# Patient Record
Sex: FEMALE | Race: WHITE | Employment: FULL TIME | URBAN - METROPOLITAN AREA
[De-identification: names, ages, dates, MRNs, and addresses within clinical notes are randomized per-mention and may not be internally consistent; named-entity substitution may affect disease eponyms.]

---

## 2022-02-27 ENCOUNTER — APPOINTMENT (OUTPATIENT)
Dept: GENERAL RADIOLOGY | Age: 62
End: 2022-02-27
Payer: COMMERCIAL

## 2022-02-27 ENCOUNTER — HOSPITAL ENCOUNTER (EMERGENCY)
Age: 62
Discharge: HOME | End: 2022-02-27
Payer: COMMERCIAL

## 2022-02-27 VITALS
BODY MASS INDEX: 30.63 KG/M2 | RESPIRATION RATE: 20 BRPM | WEIGHT: 156 LBS | DIASTOLIC BLOOD PRESSURE: 78 MMHG | HEIGHT: 60 IN | HEART RATE: 54 BPM | TEMPERATURE: 98.1 F | OXYGEN SATURATION: 99 % | SYSTOLIC BLOOD PRESSURE: 136 MMHG

## 2022-02-27 DIAGNOSIS — S60.222A CONTUSION OF LEFT HAND, INITIAL ENCOUNTER: ICD-10-CM

## 2022-02-27 DIAGNOSIS — S00.31XA NASAL ABRASION, INITIAL ENCOUNTER: Primary | ICD-10-CM

## 2022-02-27 PROCEDURE — 99282 EMERGENCY DEPT VISIT SF MDM: CPT

## 2022-02-27 PROCEDURE — 73130 X-RAY EXAM OF HAND: CPT

## 2022-02-27 RX ORDER — BACITRACIN, NEOMYCIN, POLYMYXIN B 400; 3.5; 5 [USP'U]/G; MG/G; [USP'U]/G
OINTMENT TOPICAL
Status: DISCONTINUED
Start: 2022-02-27 | End: 2022-02-27 | Stop reason: HOSPADM

## 2022-02-27 ASSESSMENT — PAIN SCALES - GENERAL: PAINLEVEL_OUTOF10: 8

## 2022-02-27 ASSESSMENT — PAIN - FUNCTIONAL ASSESSMENT: PAIN_FUNCTIONAL_ASSESSMENT: 0-10

## 2022-02-27 NOTE — ED PROVIDER NOTES
Depression     Hypertension     MS (multiple sclerosis) ([facility] [address]          SURGICAL HISTORY     Past Surgical History:   Procedure Laterality Date    HIP SURGERY      bilateral hip replace    WISDOM TOOTH EXTRACTION           CURRENTMEDICATIONS       Discharge Medication List as of 2/27/2022  2:36 PM      CONTINUE these medications which have NOT CHANGED    Details   LISINOPRIL PO Take by mouth      NIFEdipine (PROCARDIA PO) Take by mouth      acetaminophen 650 MG TABS Take 650 mg by mouth every 4 hours as needed for Pain (For mild pain level 1-3 or for fever > 100.5). , Disp-120 tablet, R-0      omeprazole (PRILOSEC) 20 MG capsule Take 1 capsule by mouth 2 times daily. , Disp-30 capsule, R-3      diltiazem (DILACOR XR) 240 MG XR capsule Take 240 mg by mouth daily. diclofenac (VOLTAREN) 75 MG EC tablet Take 75 mg by mouth 2 times daily. DULoxetine (CYMBALTA) 30 MG capsule Take 30 mg by mouth daily. interferon beta-1a (AVONEX) 30 MCG/0.5ML injection Inject 30 mcg into the muscle once a week. Saturdays               ALLERGIES     Ampicillin and Bee venom    FAMILYHISTORY     No family history on file. SOCIAL HISTORY       Social History     Tobacco Use    Smoking status: Never Smoker    Smokeless tobacco: Never Used   Substance Use Topics    Alcohol use: Yes     Comment: 1-2 glasses of wine daily    Drug use: No       SCREENINGS             PHYSICAL EXAM    (up to 7 for level 4, 8 or more for level 5)     ED Triage Vitals   BP Temp Temp Source Pulse Resp SpO2 Height Weight   02/27/22 1339 02/27/22 1339 02/27/22 1339 02/27/22 1339 02/27/22 1339 02/27/22 1339 02/27/22 1341 02/27/22 1341   136/78 98.1 °F (36.7 °C) Oral 54 20 99 % 5' (1.524 m) 156 lb (70.8 kg)       Physical Exam  Vitals and nursing note reviewed. Constitutional:       Appearance: Normal appearance. She is well-developed and normal weight. HENT:      Head: Normocephalic and atraumatic.       Right Ear: External ear normal. Left Ear: External ear normal.      Nose: Nose normal.      Comments: Patient with abrasion on the nasal bridge mostly noted and the left. No evidence of nasal bleed. Eyes:      General: No scleral icterus. Right eye: No discharge. Left eye: No discharge. Conjunctiva/sclera: Conjunctivae normal.   Cardiovascular:      Rate and Rhythm: Normal rate. Pulmonary:      Effort: Pulmonary effort is normal.   Musculoskeletal:         General: Swelling and tenderness present. Normal range of motion. Cervical back: Normal range of motion and neck supple. No tenderness. Comments: Patient had swelling involving the patient's dominant left hand mostly involving the dorsal aspect of the second and third metacarpals. On the palmar aspect she has evidence of contracture #4 metacarpal.  She has some discomfort with full extension on the lateral hand area. Imaging revealing no fracture. No rotation deformity. Skin:     General: Skin is warm and dry. Neurological:      General: No focal deficit present. Mental Status: She is alert and oriented to person, place, and time. Mental status is at baseline. Psychiatric:         Mood and Affect: Mood normal.         Behavior: Behavior normal.         Thought Content: Thought content normal.         Judgment: Judgment normal.         DIAGNOSTIC RESULTS   LABS:    Labs Reviewed - No data to display    When ordered only abnormal lab results are displayed. All other labs were within normal range or not returned as of this dictation. EKG: When ordered, EKG's are interpreted by the Emergency Department Physician in the absence of a cardiologist.  Please see their note for interpretation of EKG.     RADIOLOGY:   Non-plain film images such as CT, Ultrasound and MRI are read by the radiologist. Plain radiographic images are visualized and preliminarily interpreted by the ED Provider with the below findings:        Interpretation per the Radiologist nasal bridge I recommended cool compresses along with bacitracin 2-3 times daily for next several days. She will follow with PCP later this week. She is aware to return to this facility if any problems. Patient does express understanding the diagnosis and the treatment plan. FINAL IMPRESSION      1. Nasal abrasion, initial encounter    2. Contusion of left hand, initial encounter          DISPOSITION/PLAN   DISPOSITION Decision To Discharge 02/27/2022 02:09:44 PM      PATIENT REFERRED TO:  Stephanie Naqvi  [address] [place] [id number] [phone]    Schedule an appointment as soon as possible for a visit in 3 days  As needed    [facility] Emergency Department  [facility] [id number] [phone]  Go to   If symptoms worsen      DISCHARGE MEDICATIONS:  Discharge Medication List as of 2/27/2022  2:36 PM          DISCONTINUED MEDICATIONS:  Discharge Medication List as of 2/27/2022  2:36 PM                 (Please note that portions of this note were completed with a voice recognition program.  Efforts were made to edit the dictations but occasionally words are mis-transcribed. )    Rosa Gimenez PA-C (electronically signed)           Rosa Gimenez PA-C  02/27/22 [id number]